# Patient Record
Sex: FEMALE | Race: OTHER | NOT HISPANIC OR LATINO | URBAN - METROPOLITAN AREA
[De-identification: names, ages, dates, MRNs, and addresses within clinical notes are randomized per-mention and may not be internally consistent; named-entity substitution may affect disease eponyms.]

---

## 2024-06-12 ENCOUNTER — HOSPITAL ENCOUNTER (EMERGENCY)
Facility: HOSPITAL | Age: 39
Discharge: HOME | End: 2024-06-12
Attending: STUDENT IN AN ORGANIZED HEALTH CARE EDUCATION/TRAINING PROGRAM
Payer: OTHER GOVERNMENT

## 2024-06-12 VITALS
RESPIRATION RATE: 16 BRPM | SYSTOLIC BLOOD PRESSURE: 134 MMHG | TEMPERATURE: 98 F | HEART RATE: 71 BPM | OXYGEN SATURATION: 99 % | WEIGHT: 180 LBS | DIASTOLIC BLOOD PRESSURE: 73 MMHG | HEIGHT: 69 IN | BODY MASS INDEX: 26.66 KG/M2

## 2024-06-12 DIAGNOSIS — K57.32 CECAL DIVERTICULITIS: Primary | ICD-10-CM

## 2024-06-12 DIAGNOSIS — R10.31 RIGHT LOWER QUADRANT ABDOMINAL PAIN: ICD-10-CM

## 2024-06-12 LAB
ALBUMIN SERPL BCP-MCNC: 3.9 G/DL (ref 3.5–5.2)
ALP SERPL-CCNC: 66 U/L (ref 55–135)
ALT SERPL W/O P-5'-P-CCNC: 12 U/L (ref 10–44)
ANION GAP SERPL CALC-SCNC: 6 MMOL/L (ref 8–16)
AST SERPL-CCNC: 14 U/L (ref 10–40)
B-HCG UR QL: NEGATIVE
BACTERIA #/AREA URNS AUTO: ABNORMAL /HPF
BASOPHILS # BLD AUTO: 0.04 K/UL (ref 0–0.2)
BASOPHILS NFR BLD: 0.4 % (ref 0–1.9)
BILIRUB SERPL-MCNC: 0.5 MG/DL (ref 0.1–1)
BILIRUB UR QL STRIP: NEGATIVE
BUN SERPL-MCNC: 8 MG/DL (ref 6–20)
CALCIUM SERPL-MCNC: 10 MG/DL (ref 8.7–10.5)
CHLORIDE SERPL-SCNC: 104 MMOL/L (ref 95–110)
CLARITY UR REFRACT.AUTO: CLEAR
CO2 SERPL-SCNC: 26 MMOL/L (ref 23–29)
COLOR UR AUTO: YELLOW
CREAT SERPL-MCNC: 1 MG/DL (ref 0.5–1.4)
CTP QC/QA: YES
DIFFERENTIAL METHOD BLD: ABNORMAL
EOSINOPHIL # BLD AUTO: 0.1 K/UL (ref 0–0.5)
EOSINOPHIL NFR BLD: 1.4 % (ref 0–8)
ERYTHROCYTE [DISTWIDTH] IN BLOOD BY AUTOMATED COUNT: 11 % (ref 11.5–14.5)
EST. GFR  (NO RACE VARIABLE): >60 ML/MIN/1.73 M^2
GLUCOSE SERPL-MCNC: 86 MG/DL (ref 70–110)
GLUCOSE UR QL STRIP: NEGATIVE
HCT VFR BLD AUTO: 40.2 % (ref 37–48.5)
HCV AB SERPL QL IA: NORMAL
HGB BLD-MCNC: 13.3 G/DL (ref 12–16)
HGB UR QL STRIP: ABNORMAL
HIV 1+2 AB+HIV1 P24 AG SERPL QL IA: NORMAL
HYALINE CASTS UR QL AUTO: 2 /LPF
IMM GRANULOCYTES # BLD AUTO: 0.03 K/UL (ref 0–0.04)
IMM GRANULOCYTES NFR BLD AUTO: 0.3 % (ref 0–0.5)
KETONES UR QL STRIP: ABNORMAL
LEUKOCYTE ESTERASE UR QL STRIP: NEGATIVE
LIPASE SERPL-CCNC: 13 U/L (ref 4–60)
LYMPHOCYTES # BLD AUTO: 1.4 K/UL (ref 1–4.8)
LYMPHOCYTES NFR BLD: 15.9 % (ref 18–48)
MCH RBC QN AUTO: 32.1 PG (ref 27–31)
MCHC RBC AUTO-ENTMCNC: 33.1 G/DL (ref 32–36)
MCV RBC AUTO: 97 FL (ref 82–98)
MICROSCOPIC COMMENT: ABNORMAL
MONOCYTES # BLD AUTO: 0.8 K/UL (ref 0.3–1)
MONOCYTES NFR BLD: 8.9 % (ref 4–15)
NEUTROPHILS # BLD AUTO: 6.6 K/UL (ref 1.8–7.7)
NEUTROPHILS NFR BLD: 73.1 % (ref 38–73)
NITRITE UR QL STRIP: NEGATIVE
NRBC BLD-RTO: 0 /100 WBC
PH UR STRIP: 6 [PH] (ref 5–8)
PLATELET # BLD AUTO: 326 K/UL (ref 150–450)
PMV BLD AUTO: 10.1 FL (ref 9.2–12.9)
POTASSIUM SERPL-SCNC: 4 MMOL/L (ref 3.5–5.1)
PROT SERPL-MCNC: 7.1 G/DL (ref 6–8.4)
PROT UR QL STRIP: ABNORMAL
RBC # BLD AUTO: 4.14 M/UL (ref 4–5.4)
RBC #/AREA URNS AUTO: 1 /HPF (ref 0–4)
SODIUM SERPL-SCNC: 136 MMOL/L (ref 136–145)
SP GR UR STRIP: >1.03 (ref 1–1.03)
SQUAMOUS #/AREA URNS AUTO: 3 /HPF
URN SPEC COLLECT METH UR: ABNORMAL
WBC # BLD AUTO: 8.99 K/UL (ref 3.9–12.7)
WBC #/AREA URNS AUTO: 2 /HPF (ref 0–5)

## 2024-06-12 PROCEDURE — 25000003 PHARM REV CODE 250: Performed by: EMERGENCY MEDICINE

## 2024-06-12 PROCEDURE — 81025 URINE PREGNANCY TEST: CPT | Performed by: EMERGENCY MEDICINE

## 2024-06-12 PROCEDURE — 80053 COMPREHEN METABOLIC PANEL: CPT | Performed by: EMERGENCY MEDICINE

## 2024-06-12 PROCEDURE — 63600175 PHARM REV CODE 636 W HCPCS: Performed by: STUDENT IN AN ORGANIZED HEALTH CARE EDUCATION/TRAINING PROGRAM

## 2024-06-12 PROCEDURE — 83690 ASSAY OF LIPASE: CPT | Performed by: EMERGENCY MEDICINE

## 2024-06-12 PROCEDURE — 96374 THER/PROPH/DIAG INJ IV PUSH: CPT

## 2024-06-12 PROCEDURE — 25500020 PHARM REV CODE 255: Performed by: STUDENT IN AN ORGANIZED HEALTH CARE EDUCATION/TRAINING PROGRAM

## 2024-06-12 PROCEDURE — 81001 URINALYSIS AUTO W/SCOPE: CPT | Performed by: EMERGENCY MEDICINE

## 2024-06-12 PROCEDURE — 99285 EMERGENCY DEPT VISIT HI MDM: CPT | Mod: 25

## 2024-06-12 PROCEDURE — 85025 COMPLETE CBC W/AUTO DIFF WBC: CPT | Performed by: EMERGENCY MEDICINE

## 2024-06-12 PROCEDURE — 87389 HIV-1 AG W/HIV-1&-2 AB AG IA: CPT | Performed by: PHYSICIAN ASSISTANT

## 2024-06-12 PROCEDURE — 96375 TX/PRO/DX INJ NEW DRUG ADDON: CPT

## 2024-06-12 PROCEDURE — 86803 HEPATITIS C AB TEST: CPT | Performed by: PHYSICIAN ASSISTANT

## 2024-06-12 RX ORDER — AMOXICILLIN AND CLAVULANATE POTASSIUM 875; 125 MG/1; MG/1
1 TABLET, FILM COATED ORAL 2 TIMES DAILY
Qty: 20 TABLET | Refills: 0 | Status: SHIPPED | OUTPATIENT
Start: 2024-06-12 | End: 2024-06-22

## 2024-06-12 RX ORDER — ONDANSETRON 4 MG/1
8 TABLET, ORALLY DISINTEGRATING ORAL EVERY 8 HOURS PRN
Qty: 12 TABLET | Refills: 0 | Status: SHIPPED | OUTPATIENT
Start: 2024-06-12

## 2024-06-12 RX ORDER — ONDANSETRON HYDROCHLORIDE 2 MG/ML
4 INJECTION, SOLUTION INTRAVENOUS
Status: COMPLETED | OUTPATIENT
Start: 2024-06-12 | End: 2024-06-12

## 2024-06-12 RX ORDER — KETOROLAC TROMETHAMINE 30 MG/ML
10 INJECTION, SOLUTION INTRAMUSCULAR; INTRAVENOUS
Status: COMPLETED | OUTPATIENT
Start: 2024-06-12 | End: 2024-06-12

## 2024-06-12 RX ORDER — MORPHINE SULFATE 4 MG/ML
4 INJECTION, SOLUTION INTRAMUSCULAR; INTRAVENOUS
Status: COMPLETED | OUTPATIENT
Start: 2024-06-12 | End: 2024-06-12

## 2024-06-12 RX ORDER — AMOXICILLIN AND CLAVULANATE POTASSIUM 875; 125 MG/1; MG/1
1 TABLET, FILM COATED ORAL
Status: COMPLETED | OUTPATIENT
Start: 2024-06-12 | End: 2024-06-12

## 2024-06-12 RX ADMIN — KETOROLAC TROMETHAMINE 10 MG: 30 INJECTION, SOLUTION INTRAMUSCULAR at 08:06

## 2024-06-12 RX ADMIN — ONDANSETRON 4 MG: 2 INJECTION INTRAMUSCULAR; INTRAVENOUS at 10:06

## 2024-06-12 RX ADMIN — AMOXICILLIN AND CLAVULANATE POTASSIUM 1 TABLET: 875; 125 TABLET, FILM COATED ORAL at 11:06

## 2024-06-12 RX ADMIN — MORPHINE SULFATE 4 MG: 4 INJECTION INTRAVENOUS at 10:06

## 2024-06-12 RX ADMIN — IOHEXOL 75 ML: 350 INJECTION, SOLUTION INTRAVENOUS at 08:06

## 2024-06-12 NOTE — Clinical Note
"Gwendolyn Moralesmarylin Welch was seen and treated in our emergency department on 6/12/2024.  She may return to work on 06/15/2024.       If you have any questions or concerns, please don't hesitate to call.      Saniya Sanchez MD"

## 2024-06-12 NOTE — FIRST PROVIDER EVALUATION
"Medical screening examination initiated.  I have conducted a focused provider triage encounter, findings are as follows:    Brief history of present illness:  generalized abd pain x2d, moved to [facility] today.  Anorexia x2d. "I think I have appendicitis."    Vitals:    06/12/24 1635   BP: 122/79   BP Location: Right arm   Patient Position: Sitting   Pulse: 93   Resp: 19   Temp: 97.9 °F (36.6 °C)   TempSrc: Oral   SpO2: 98%   Weight: 81.6 kg (180 lb)   Height: 5' 9" (1.753 m)       Pertinent physical exam:  RLQ TTP    Brief workup plan:  CT, labs    Preliminary workup initiated; this workup will be continued and followed by the physician or advanced practice provider that is assigned to the patient when roomed.  "

## 2024-06-13 NOTE — ED NOTES
"Gwendolyn Welch, a 39 y.o. female presents to the ED w/ complaint of RLQ  abd pain, 2/10 when not moving 8/10 when touching or moving around    Triage note:  Chief Complaint   Patient presents with    Abdominal Pain     RLQ abd pain; started as a lower center cramps. "Hurting when going over bumps in the car". Pain got worse throughout the day. Nausea yesterday. No fever. No vomiting pt is concerned for appendicitis      Review of patient's allergies indicates:  Not on File  History reviewed. No pertinent past medical history.    Patient identifiers verified and correct for Gwendolyn Welch.    LOC: The patient is awake, alert and oriented x 4. Pt is speaking appropriately, no slurred speech.  APPEARANCE: Patient resting comfortably and in no acute distress. Pt is clean and well groomed. No JVD visible. Pt reports pain level of 0.  SKIN: Skin is warm dry and intact, and color is consistent with ethnicity. No tenting observed and capillary refill <3 seconds. No clubbing noted to nail beds. No breakdown or brusing visible and mucus membranes moist and acyanotic.  MUSCULOSKELETAL: Full range of motion present in all extremities. Hand  equal and leg strength strong +2 bilaterally.  RESPIRATORY: Airway is open and patent. Respirations-unlabored, regular rate, equal bilaterally on inspiration and expiration. No accessory muscle use noted. Lungs clear to auscultation in all fields bilaterally anterior and posterior.   CARDIAC: Patient has regular heart rate and rhythm.  No peripheral edema noted, and patient has no c/o chest pain.  ABDOMEN: RLQ abdominal pain  NEUROLOGIC: Eyes open spontaneously and facial expression symmetrical. Pt behavior appropriate to situation, and pt follows commands.  Pt reports sensation present in all extremities when touched with a finger. No s/s of ischemic stroke. PERRLA  : No complaints of frequency, burning, urgency or blood in the urine.          "

## 2024-06-13 NOTE — ED PROVIDER NOTES
"Encounter Date: 6/12/2024       History     Chief Complaint   Patient presents with    Abdominal Pain     RLQ abd pain; started as a lower center cramps. "Hurting when going over bumps in the car". Pain got worse throughout the day. Nausea yesterday. No fever. No vomiting pt is concerned for appendicitis      39-year-old female presents with abdominal pain.  Patient reports 3-4 day history of initially periumbilical abdominal pain but now right lower quadrant pain.  She describes periumbilical cramping for the past few days intermittently.  Today it seemed to migrate to the right lower quadrant.  It is intermittent and at its worst is around an 8/10.  When she was just sitting at rest it has around a 4/10.  She had nausea yesterday but not today and has not had any vomiting.  Denies fever, chest pain, shortness of breath, dysuria, hematuria, diarrhea, constipation, black/bloody stools, vaginal bleeding, and vaginal discharge.  She has an IUD and has irregular periods.  Denies any history of intra-abdominal or pelvic surgeries.  She has not taken any medications at home for her symptoms.    The history is provided by the patient.     Review of patient's allergies indicates:  No Known Allergies  History reviewed. No pertinent past medical history.  History reviewed. No pertinent surgical history.  No family history on file.     Review of Systems    Physical Exam     Initial Vitals [06/12/24 1635]   BP Pulse Resp Temp SpO2   122/79 93 19 97.9 °F (36.6 °C) 98 %      MAP       --         Physical Exam    Nursing note and vitals reviewed.  Constitutional: She appears well-developed and well-nourished. She is not diaphoretic. No distress.   HENT:   Head: Normocephalic and atraumatic.   Eyes: Conjunctivae and EOM are normal. Pupils are equal, round, and reactive to light.   Neck: Neck supple.   Normal range of motion.  Cardiovascular:  Normal rate, regular rhythm, normal heart sounds and intact distal pulses.           No " murmur heard.  Pulmonary/Chest: Breath sounds normal. No respiratory distress. She has no wheezes. She has no rhonchi. She has no rales.   Abdominal: Abdomen is soft.   Soft, mildly distended abdomen with notable TTP in RLQ and RUQ. No rebound or guarding There is no rebound and no guarding.   Musculoskeletal:         General: No tenderness or edema.      Cervical back: Normal range of motion and neck supple.     Neurological: She is alert and oriented to person, place, and time.   Skin: Skin is warm and dry. Capillary refill takes less than 2 seconds.         ED Course   Procedures  Labs Reviewed   CBC W/ AUTO DIFFERENTIAL - Abnormal; Notable for the following components:       Result Value    MCH 32.1 (*)     RDW 11.0 (*)     Gran % 73.1 (*)     Lymph % 15.9 (*)     All other components within normal limits    Narrative:     Release to patient->Immediate   COMPREHENSIVE METABOLIC PANEL - Abnormal; Notable for the following components:    Anion Gap 6 (*)     All other components within normal limits    Narrative:     Release to patient->Immediate   URINALYSIS, REFLEX TO URINE CULTURE - Abnormal; Notable for the following components:    Specific Gravity, UA >1.030 (*)     Protein, UA 1+ (*)     Ketones, UA Trace (*)     Occult Blood UA Trace (*)     All other components within normal limits    Narrative:     Specimen Source->Urine   URINALYSIS MICROSCOPIC - Abnormal; Notable for the following components:    Hyaline Casts, UA 2 (*)     All other components within normal limits    Narrative:     Specimen Source->Urine   HIV 1 / 2 ANTIBODY    Narrative:     Release to patient->Immediate   HEPATITIS C ANTIBODY    Narrative:     Release to patient->Immediate   LIPASE    Narrative:     Release to patient->Immediate   POCT URINE PREGNANCY   ISTAT CHEM8          Imaging Results              CT Abdomen Pelvis With IV Contrast NO Oral Contrast (In process)                      Medications   morphine injection 4 mg (has no  administration in time range)   ondansetron injection 4 mg (has no administration in time range)   ketorolac injection 9.999 mg (9.999 mg Intravenous Given 6/12/24 2012)   iohexoL (OMNIPAQUE 350) injection 75 mL (75 mLs Intravenous Given 6/12/24 2046)     Medical Decision Making  Differential diagnoses considered includes appendicitis, colitis, cholecystitis, gastritis/PUD, nephrolithiasis, pyelonephritis, ectopic pregnancy, doubt ovarian torsion    Initial symptom management with Toradol, morphine, and Zofran.  See ED course for additional information.    Amount and/or Complexity of Data Reviewed  Labs:  Decision-making details documented in ED Course.    Risk  Prescription drug management.  Parenteral controlled substances.               ED Course as of 06/12/24 2054 Wed Jun 12, 2024 1945 CBC W/ AUTO DIFFERENTIAL(!)  CBC unremarkable without leukocytosis, significant anemia, or decreased platelets   [BD]   1945 Comp. Metabolic Panel(!)  CMP unremarkable without significant electrolyte derangement, impaired renal function, or elevated LFTs   [BD]   1945 Urinalysis, Reflex to Urine Culture Urine, Clean Catch(!)  Inconsistent with UTI. Minimal blood, so kidney stone less likely but CT is pending [BD]   1945 Lipase: 13  Inconsistent with pancreatitis [BD]   1945 hCG Qualitative, Urine: Negative [BD]   2054 Patient care will be handed off to oncoming team at 9:00 p.m., pending CT abdomen pelvis. [BD]      ED Course User Index  [BD] Charanjit Hu MD                           Clinical Impression:  Final diagnoses:  [R10.31] Right lower quadrant abdominal pain (Primary)                 Charanjit Hu MD  06/12/24 2054

## 2024-06-13 NOTE — DISCHARGE INSTRUCTIONS
Imaging Results               CT Abdomen Pelvis With IV Contrast NO Oral Contrast (Final result)  Result time 06/12/24 22:51:41      Final result by Gerber Kee MD (06/12/24 22:51:41)                   Impression:      Uncomplicated cecal diverticulitis (compatible with Hinchey stage Ia).  No definite evidence of perforation.    Poorly defined right upper renal pole hypoattenuating lesion, possibly a mass; malignancy or focal segmental pyelonephritis not excluded.  Recommendations include clinical correlation, prompt outpatient urology referral, and prompt outpatient renal mass protocol MRI or CT.    Mild hepatomegaly.    An IUD projects within the anticipated region of the poorly defined fundal endometrium.    Small hiatal hernia.    Additional findings as detailed in the body of the report.    This report was flagged in Epic as abnormal.    Electronically signed by resident: Jaylene Lopez  Date:    06/12/2024  Time:    21:11    Electronically signed by: Gerber Kee  Date:    06/12/2024  Time:    22:51               Narrative:    EXAMINATION:  CT ABDOMEN PELVIS WITH IV CONTRAST    CLINICAL HISTORY:  RLQ abdominal pain (Age >= 14y);    TECHNIQUE:  Using 75 mL of Omnipaque 350 IV contrast , and multi-detector helical CT technique, axial CT images of the abdomen and pelvis were obtained. 2D post-processing coronal and sagittal reconstructions was performed.    COMPARISON:  None.    FINDINGS:  Lung base: Mild bi basilar dependent atelectasis.    Visualized portion of heart: No significant abnormality.    Liver: Hepatomegaly measuring 18.3 cm in clinical examination.Small anteromedial left lobe medial 6 hypodensity adjacent to the falciform ligament, likely representing fatty infiltration.    Gallbladder: No significant abnormality.    Bile duct: No intra-or extrahepatic biliary ductal dilatation.    Spleen: No significant abnormality.    Pancreas: No significant abnormality.    Adrenal glands: No significant  abnormality.    Genitourinary: Right kidney superomedial aspect ill-defined hypodensity measuring 2.0 x 1.9 cm.    Small bilateral extrarenal pelves, left more prominent than right.    The ureters appear normal in course and caliber.    No evidence of nephrolithiasis or hydroureteronephrosis.    The urinary bladder is only mildly distended, limiting CT assessment for bladder wall thickening.    Reproductive organs: An IUD projects within the anticipated region of the poorly defined fundal endometrium.  No acute adnexal abnormalities apparent on this CT.    GI tract: Small hiatal hernia.  The visualized loops of small and large bowel show no evidence of obstruction.  Diverticulosis.  Focal cecal wall thickening associated with adjacent fat stranding.  No discrete fluid collection.  Appendix is normal (601-98).    Peritoneum: No free intraperitoneal air.    Trace intraperitoneal fluid in the pelvic cul-de-sac.    Retroperitoneum: No significant adenopathy.    Vasculature: Normal caliber of abdominal aorta with no significant atherosclerosis.    Right main renal vein, suprarenal IVC, and visualized caudal portions of the right atrium demonstrate no overt thrombi.    Abdominal wall: Tiny fat-containing umbilical hernia.    The breasts are not fully included in the scan, but their visualized caudal portions demonstrate some fairly dense breast tissue bilaterally.  Radiographically dense breast tissue may be associated with an increased lifetime risk of developing breast cancer.    Bones: No acute osseous abnormality.  Mild multilevel thoracolumbar Schmorl's nodes.

## 2024-06-13 NOTE — PROVIDER PROGRESS NOTES - EMERGENCY DEPT.
Imaging Results               CT Abdomen Pelvis With IV Contrast NO Oral Contrast (Final result)  Result time 06/12/24 22:51:41      Final result by Gerber Kee MD (06/12/24 22:51:41)                   Impression:      Uncomplicated cecal diverticulitis (compatible with Hinchey stage Ia).  No definite evidence of perforation.    Poorly defined right upper renal pole hypoattenuating lesion, possibly a mass; malignancy or focal segmental pyelonephritis not excluded.  Recommendations include clinical correlation, prompt outpatient urology referral, and prompt outpatient renal mass protocol MRI or CT.    Mild hepatomegaly.    An IUD projects within the anticipated region of the poorly defined fundal endometrium.    Small hiatal hernia.    Additional findings as detailed in the body of the report.    This report was flagged in Epic as abnormal.    Electronically signed by resident: Jaylene Lopez  Date:    06/12/2024  Time:    21:11    Electronically signed by: Gerber Kee  Date:    06/12/2024  Time:    22:51               Narrative:    EXAMINATION:  CT ABDOMEN PELVIS WITH IV CONTRAST    CLINICAL HISTORY:  RLQ abdominal pain (Age >= 14y);    TECHNIQUE:  Using 75 mL of Omnipaque 350 IV contrast , and multi-detector helical CT technique, axial CT images of the abdomen and pelvis were obtained. 2D post-processing coronal and sagittal reconstructions was performed.    COMPARISON:  None.    FINDINGS:  Lung base: Mild bi basilar dependent atelectasis.    Visualized portion of heart: No significant abnormality.    Liver: Hepatomegaly measuring 18.3 cm in clinical examination.Small anteromedial left lobe medial 6 hypodensity adjacent to the falciform ligament, likely representing fatty infiltration.    Gallbladder: No significant abnormality.    Bile duct: No intra-or extrahepatic biliary ductal dilatation.    Spleen: No significant abnormality.    Pancreas: No significant abnormality.    Adrenal glands: No significant  abnormality.    Genitourinary: Right kidney superomedial aspect ill-defined hypodensity measuring 2.0 x 1.9 cm.    Small bilateral extrarenal pelves, left more prominent than right.    The ureters appear normal in course and caliber.    No evidence of nephrolithiasis or hydroureteronephrosis.    The urinary bladder is only mildly distended, limiting CT assessment for bladder wall thickening.    Reproductive organs: An IUD projects within the anticipated region of the poorly defined fundal endometrium.  No acute adnexal abnormalities apparent on this CT.    GI tract: Small hiatal hernia.  The visualized loops of small and large bowel show no evidence of obstruction.  Diverticulosis.  Focal cecal wall thickening associated with adjacent fat stranding.  No discrete fluid collection.  Appendix is normal (601-98).    Peritoneum: No free intraperitoneal air.    Trace intraperitoneal fluid in the pelvic cul-de-sac.    Retroperitoneum: No significant adenopathy.    Vasculature: Normal caliber of abdominal aorta with no significant atherosclerosis.    Right main renal vein, suprarenal IVC, and visualized caudal portions of the right atrium demonstrate no overt thrombi.    Abdominal wall: Tiny fat-containing umbilical hernia.    The breasts are not fully included in the scan, but their visualized caudal portions demonstrate some fairly dense breast tissue bilaterally.  Radiographically dense breast tissue may be associated with an increased lifetime risk of developing breast cancer.    Bones: No acute osseous abnormality.  Mild multilevel thoracolumbar Schmorl's nodes.                                     Patient was signed out pending CT abdomen pelvis.  CT shows acute uncomplicated diverticulitis. Patient started on abx, close outpatient follow up, strict return precautions discussed and patient agreeable with the plan.

## 2024-10-17 ENCOUNTER — OFFICE VISIT (OUTPATIENT)
Dept: INTERNAL MEDICINE | Facility: CLINIC | Age: 39
End: 2024-10-17
Payer: OTHER GOVERNMENT

## 2024-10-17 DIAGNOSIS — Z87.820 HX OF CONCUSSION: ICD-10-CM

## 2024-10-17 DIAGNOSIS — Z76.89 ENCOUNTER TO ESTABLISH CARE: Primary | ICD-10-CM

## 2024-10-17 DIAGNOSIS — R21 RASH: ICD-10-CM

## 2024-10-17 PROCEDURE — 99204 OFFICE O/P NEW MOD 45 MIN: CPT | Mod: 95,,, | Performed by: INTERNAL MEDICINE

## 2024-10-17 RX ORDER — BUPROPION HYDROCHLORIDE 150 MG/1
150 TABLET ORAL DAILY
COMMUNITY
Start: 2023-10-26

## 2024-10-17 RX ORDER — TRAZODONE HYDROCHLORIDE 100 MG/1
50 TABLET ORAL NIGHTLY
COMMUNITY
Start: 2024-03-14

## 2024-10-17 RX ORDER — LAMOTRIGINE 200 MG/1
200 TABLET ORAL DAILY
COMMUNITY
Start: 2023-10-26

## 2024-10-17 NOTE — PROGRESS NOTES
Subjective:       Patient ID: Gwendolyn Welch is a 39 y.o. female.    Chief Complaint: No chief complaint on file.      The patient location is: [place]   The chief complaint leading to consultation is: follow up     Visit type: audiovisual    Face to Face time with patient: 15  minutes  15 minutes of total time spent on the encounter, which includes face to face time and non-face to face time preparing to see the patient (eg, review of tests), Obtaining and/or reviewing separately obtained history, Documenting clinical information in the electronic or other health record, Independently interpreting results (not separately reported) and communicating results to the patient/family/caregiver, or Care coordination (not separately reported).         Each patient to whom he or she provides medical services by telemedicine is:  (1) informed of the relationship between the physician and patient and the respective role of any other health care provider with respect to management of the patient; and (2) notified that he or she may decline to receive medical services by telemedicine and may withdraw from such care at any time.    Notes:     HPI    Presents to establish care.     Hx of concussion.   Plays in roller derby.     Bipolar: on Wellbutrin and lamotrigine. Follows NP in psych. And trazodone for insomnia and anxiety     Health Maintenance:  Colon Cancer Screening: begin at age 45  Mammogram: begin at age 40   HIV: order today   Hep C: order today   Lipids:order today   Pap Smear:  needs to schedule  Vaccines:  up to date       Review of Systems   Constitutional:  Negative for activity change and unexpected weight change.   HENT:  Positive for rhinorrhea. Negative for hearing loss and trouble swallowing.    Eyes:  Positive for discharge and visual disturbance.   Respiratory:  Negative for chest tightness and wheezing.    Cardiovascular:  Negative for chest pain and palpitations.   Gastrointestinal:  Negative for blood in  stool, constipation, diarrhea and vomiting.   Endocrine: Negative for polydipsia and polyuria.   Genitourinary:  Negative for difficulty urinating, dysuria, hematuria and menstrual problem.   Musculoskeletal:  Negative for arthralgias, joint swelling and neck pain.   Neurological:  Positive for headaches. Negative for weakness.   Psychiatric/Behavioral:  Positive for confusion. Negative for dysphoric mood.          Objective:      Physical Exam  Constitutional:       General: She is not in acute distress.     Appearance: Normal appearance. She is not ill-appearing.   HENT:      Head: Normocephalic and atraumatic.   Pulmonary:      Effort: Pulmonary effort is normal.   Neurological:      Mental Status: She is alert.   Psychiatric:         Mood and Affect: Mood normal.         Behavior: Behavior normal.         Assessment:       Problem List Items Addressed This Visit    None  Visit Diagnoses       Encounter to establish care    -  Primary    Relevant Orders    Ambulatory referral/consult to Obstetrics / Gynecology    Comprehensive Metabolic Panel    CBC Auto Differential    Lipid Panel    Hemoglobin A1C    TSH    Hx of concussion        Relevant Orders    Ambulatory referral/consult to Concussion Management Program    Rash        Relevant Orders    Ambulatory referral/consult to Dermatology            Plan:       Diagnoses and all orders for this visit:    Encounter to establish care  -     Ambulatory referral/consult to Obstetrics / Gynecology; Future  -     Comprehensive Metabolic Panel; Future  -     CBC Auto Differential; Future  -     Lipid Panel; Future  -     Hemoglobin A1C; Future  -     TSH; Future  Colon Cancer Screening: begin at age 45  Mammogram: begin at age 40   HIV: order today   Hep C: order today   Lipids:order today   Pap Smear:  needs to schedule  Vaccines:  up to date     Hx of concussion  -     Ambulatory referral/consult to Concussion Management Program; Future    Rash  -     Ambulatory  referral/consult to Dermatology; Future         Check labs and follow up in the office to establish care.      Zhane Reynaga MD

## 2025-01-29 ENCOUNTER — OFFICE VISIT (OUTPATIENT)
Dept: INTERNAL MEDICINE | Facility: CLINIC | Age: 40
End: 2025-01-29
Payer: OTHER GOVERNMENT

## 2025-01-29 VITALS
BODY MASS INDEX: 23.25 KG/M2 | HEART RATE: 73 BPM | HEIGHT: 69 IN | OXYGEN SATURATION: 99 % | DIASTOLIC BLOOD PRESSURE: 71 MMHG | SYSTOLIC BLOOD PRESSURE: 128 MMHG | WEIGHT: 156.94 LBS

## 2025-01-29 DIAGNOSIS — Z87.820 HX OF CONCUSSION: ICD-10-CM

## 2025-01-29 DIAGNOSIS — Z00.00 ANNUAL PHYSICAL EXAM: Primary | ICD-10-CM

## 2025-01-29 DIAGNOSIS — R21 RASH: ICD-10-CM

## 2025-01-29 DIAGNOSIS — R42 DIZZINESS AND GIDDINESS: ICD-10-CM

## 2025-01-29 PROCEDURE — 99395 PREV VISIT EST AGE 18-39: CPT | Mod: S$PBB,,, | Performed by: INTERNAL MEDICINE

## 2025-01-29 PROCEDURE — 99215 OFFICE O/P EST HI 40 MIN: CPT | Mod: PBBFAC | Performed by: INTERNAL MEDICINE

## 2025-01-29 PROCEDURE — 99999 PR PBB SHADOW E&M-EST. PATIENT-LVL V: CPT | Mod: PBBFAC,,, | Performed by: INTERNAL MEDICINE

## 2025-01-29 NOTE — PROGRESS NOTES
Subjective:       Patient ID: Gwendolyn Welch is a 39 y.o. female.    Chief Complaint: Establish Care    HPI    Presents to establish care.     Has been experiencing multiple symptoms including dissociation, dizziness, brain fog and light sensitivity. Does have hx of concussion in the past. Play in roller derby. Denies headache.     PMHx:  Hx of Anxiety/Bipolar/Depression: follows with psych. Currently on Trazodone, Lamictal and Wellbutrin.       Health Maintenance:  Colon Cancer Screening: being at age 45   Mammogram: begin at age 40   HIV: negative 2024   Hep C: negative 2024   Lipids: normal 2024   Pap Smear: needs to schedule.   Vaccines:  up to date.       Review of Systems   Constitutional:  Negative for activity change and unexpected weight change.   HENT:  Negative for hearing loss, rhinorrhea and trouble swallowing.    Eyes:  Positive for visual disturbance. Negative for discharge.   Respiratory:  Negative for chest tightness and wheezing.    Cardiovascular:  Negative for chest pain and palpitations.   Gastrointestinal:  Negative for blood in stool, constipation, diarrhea and vomiting.   Endocrine: Negative for polydipsia and polyuria.   Genitourinary:  Negative for difficulty urinating, dysuria, hematuria and menstrual problem.   Musculoskeletal:  Negative for arthralgias, joint swelling and neck pain.   Neurological:  Negative for weakness and headaches.   Psychiatric/Behavioral:  Negative for confusion and dysphoric mood.            No past medical history on file.  No past surgical history on file.   There is no problem list on file for this patient.       Objective:      Physical Exam    Assessment:       Problem List Items Addressed This Visit    None  Visit Diagnoses         Annual physical exam    -  Primary    Relevant Orders    Comprehensive Metabolic Panel (Completed)    CBC Auto Differential (Completed)    Lipid Panel (Completed)    Hemoglobin A1C (Completed)      Hx of concussion        Relevant  Orders    Ambulatory referral/consult to Concussion Management Program      Dizziness and giddiness        Relevant Orders    TSH (Completed)    CT Head Without Contrast (Completed)    VITAMIN B12 (Completed)    VITAMIN B1 (Completed)    VITAMIN B6 (Completed)    IRON AND TIBC (Completed)    Vitamin D 25 hydroxy (Completed)    C-REACTIVE PROTEIN (Completed)    Sedimentation rate (Completed)      Rash        Relevant Orders    Ambulatory referral/consult to Dermatology            Plan:         Gwendolyn was seen today for establish care.    Diagnoses and all orders for this visit:    Annual physical exam  Colon Cancer Screening: being at age 45   Mammogram: begin at age 40   HIV: negative 2024   Hep C: negative 2024   Lipids: normal 2024   Pap Smear: needs to schedule.   Vaccines:  up to date.   Annual wellness exam completed.     All medications, histories, and concerns reviewed, reconciled, and addressed.     Appropriate Screenings per pt's sex and age have been reviewed and discussed with pt.       Hx of concussion  -     Ambulatory referral/consult to Concussion Management Program; Future    Dizziness and giddiness  -     TSH; Future  -     CT Head Without Contrast; Future  -     VITAMIN B12; Future  -     VITAMIN B1; Future  -     VITAMIN B6; Future  -     IRON AND TIBC; Future  -     Vitamin D 25 hydroxy; Future  -     C-REACTIVE PROTEIN; Future  -     Sedimentation rate; Future    Rash  -     Ambulatory referral/consult to Dermatology; Future     Check labs. Referral to concussion program for further management.             Zhane Templeton MD   Internal Medicine   Primary Care

## 2025-01-30 ENCOUNTER — TELEPHONE (OUTPATIENT)
Facility: CLINIC | Age: 40
End: 2025-01-30
Payer: OTHER GOVERNMENT

## 2025-01-31 ENCOUNTER — HOSPITAL ENCOUNTER (OUTPATIENT)
Dept: RADIOLOGY | Facility: HOSPITAL | Age: 40
Discharge: HOME | End: 2025-01-31
Attending: INTERNAL MEDICINE
Payer: OTHER GOVERNMENT

## 2025-01-31 DIAGNOSIS — R42 DIZZINESS AND GIDDINESS: ICD-10-CM

## 2025-01-31 PROCEDURE — 70450 CT HEAD/BRAIN W/O DYE: CPT | Mod: TC

## 2025-01-31 PROCEDURE — 70450 CT HEAD/BRAIN W/O DYE: CPT | Mod: 26,,, | Performed by: RADIOLOGY

## 2025-02-03 ENCOUNTER — TELEPHONE (OUTPATIENT)
Dept: DERMATOLOGY | Facility: CLINIC | Age: 40
End: 2025-02-03
Payer: OTHER GOVERNMENT

## 2025-02-17 ENCOUNTER — PATIENT MESSAGE (OUTPATIENT)
Dept: INTERNAL MEDICINE | Facility: CLINIC | Age: 40
End: 2025-02-17
Payer: OTHER GOVERNMENT

## 2025-02-17 ENCOUNTER — PATIENT MESSAGE (OUTPATIENT)
Dept: ADMINISTRATIVE | Facility: HOSPITAL | Age: 40
End: 2025-02-17
Payer: OTHER GOVERNMENT

## 2025-02-18 ENCOUNTER — RESULTS FOLLOW-UP (OUTPATIENT)
Dept: INTERNAL MEDICINE | Facility: CLINIC | Age: 40
End: 2025-02-18

## 2025-02-21 ENCOUNTER — PATIENT OUTREACH (OUTPATIENT)
Dept: ADMINISTRATIVE | Facility: HOSPITAL | Age: 40
End: 2025-02-21
Payer: OTHER GOVERNMENT

## 2025-02-21 DIAGNOSIS — Z12.4 ENCOUNTER FOR SCREENING FOR CERVICAL CANCER: Primary | ICD-10-CM

## 2025-02-21 NOTE — PROGRESS NOTES
Chart reviewed and updated. Reconciled immunizations, health maintenance and care everywhere.    Placed referral for Gyn and linked to scheduled appt.      Cesilia Klein [facility]  Panel Care Coordinator  [facility]  [phone]  Zhane Chan MD

## 2025-03-25 ENCOUNTER — OFFICE VISIT (OUTPATIENT)
Dept: OBSTETRICS AND GYNECOLOGY | Facility: CLINIC | Age: 40
End: 2025-03-25
Payer: OTHER GOVERNMENT

## 2025-03-25 ENCOUNTER — OFFICE VISIT (OUTPATIENT)
Dept: INTERNAL MEDICINE | Facility: CLINIC | Age: 40
End: 2025-03-25
Payer: OTHER GOVERNMENT

## 2025-03-25 VITALS
HEART RATE: 77 BPM | OXYGEN SATURATION: 98 % | WEIGHT: 171.5 LBS | BODY MASS INDEX: 25.4 KG/M2 | DIASTOLIC BLOOD PRESSURE: 62 MMHG | HEIGHT: 69 IN | SYSTOLIC BLOOD PRESSURE: 104 MMHG

## 2025-03-25 VITALS
BODY MASS INDEX: 25.47 KG/M2 | HEIGHT: 69 IN | DIASTOLIC BLOOD PRESSURE: 68 MMHG | WEIGHT: 171.94 LBS | SYSTOLIC BLOOD PRESSURE: 126 MMHG

## 2025-03-25 DIAGNOSIS — J32.9 SINUSITIS, UNSPECIFIED CHRONICITY, UNSPECIFIED LOCATION: Primary | ICD-10-CM

## 2025-03-25 DIAGNOSIS — N76.0 ACUTE VAGINITIS: ICD-10-CM

## 2025-03-25 DIAGNOSIS — Z12.4 ENCOUNTER FOR SCREENING FOR CERVICAL CANCER: Primary | ICD-10-CM

## 2025-03-25 DIAGNOSIS — M25.561 PAIN IN BOTH KNEES, UNSPECIFIED CHRONICITY: ICD-10-CM

## 2025-03-25 DIAGNOSIS — M25.562 PAIN IN BOTH KNEES, UNSPECIFIED CHRONICITY: ICD-10-CM

## 2025-03-25 DIAGNOSIS — Z12.31 VISIT FOR SCREENING MAMMOGRAM: ICD-10-CM

## 2025-03-25 DIAGNOSIS — R42 DIZZINESS: ICD-10-CM

## 2025-03-25 DIAGNOSIS — Z30.431 INTRAUTERINE DEVICE SURVEILLANCE: ICD-10-CM

## 2025-03-25 PROCEDURE — 99213 OFFICE O/P EST LOW 20 MIN: CPT | Mod: PBBFAC | Performed by: STUDENT IN AN ORGANIZED HEALTH CARE EDUCATION/TRAINING PROGRAM

## 2025-03-25 PROCEDURE — 99214 OFFICE O/P EST MOD 30 MIN: CPT | Mod: PBBFAC,27 | Performed by: NURSE PRACTITIONER

## 2025-03-25 PROCEDURE — 99999 PR PBB SHADOW E&M-EST. PATIENT-LVL III: CPT | Mod: PBBFAC,,, | Performed by: STUDENT IN AN ORGANIZED HEALTH CARE EDUCATION/TRAINING PROGRAM

## 2025-03-25 PROCEDURE — 99999 PR PBB SHADOW E&M-EST. PATIENT-LVL IV: CPT | Mod: PBBFAC,,, | Performed by: NURSE PRACTITIONER

## 2025-03-25 PROCEDURE — 81515 NFCT DS BV&VAGINITIS DNA ALG: CPT | Performed by: STUDENT IN AN ORGANIZED HEALTH CARE EDUCATION/TRAINING PROGRAM

## 2025-03-25 PROCEDURE — 99385 PREV VISIT NEW AGE 18-39: CPT | Mod: S$PBB,,, | Performed by: STUDENT IN AN ORGANIZED HEALTH CARE EDUCATION/TRAINING PROGRAM

## 2025-03-25 PROCEDURE — 99213 OFFICE O/P EST LOW 20 MIN: CPT | Mod: S$PBB,,, | Performed by: NURSE PRACTITIONER

## 2025-03-25 RX ORDER — BUPROPION HYDROCHLORIDE 300 MG/1
300 TABLET ORAL EVERY MORNING
COMMUNITY
Start: 2025-01-26

## 2025-03-25 RX ORDER — PROMETHAZINE HYDROCHLORIDE AND DEXTROMETHORPHAN HYDROBROMIDE 6.25; 15 MG/5ML; MG/5ML
5 SYRUP ORAL 4 TIMES DAILY PRN
COMMUNITY
Start: 2025-02-19 | End: 2025-03-25

## 2025-03-25 RX ORDER — MELOXICAM 7.5 MG/1
7.5 TABLET ORAL DAILY
Qty: 30 TABLET | Refills: 0 | Status: SHIPPED | OUTPATIENT
Start: 2025-03-25 | End: 2025-04-01

## 2025-03-25 RX ORDER — FLUTICASONE PROPIONATE 50 MCG
1 SPRAY, SUSPENSION (ML) NASAL DAILY
Qty: 16 ML | Refills: 0 | Status: SHIPPED | OUTPATIENT
Start: 2025-03-25

## 2025-03-25 RX ORDER — PREDNISONE 20 MG/1
20 TABLET ORAL
COMMUNITY
Start: 2025-02-19 | End: 2025-03-25

## 2025-03-25 RX ORDER — AMOXICILLIN 875 MG/1
875 TABLET, FILM COATED ORAL 2 TIMES DAILY
COMMUNITY
Start: 2025-02-19 | End: 2025-03-25

## 2025-03-25 RX ORDER — FLUCONAZOLE 150 MG/1
150 TABLET ORAL ONCE
Qty: 1 TABLET | Refills: 1 | Status: SHIPPED | OUTPATIENT
Start: 2025-03-25 | End: 2025-03-25

## 2025-03-25 RX ORDER — DOXYCYCLINE 100 MG/1
100 CAPSULE ORAL EVERY 12 HOURS
Qty: 14 CAPSULE | Refills: 0 | Status: SHIPPED | OUTPATIENT
Start: 2025-03-25

## 2025-03-25 RX ORDER — BENZONATATE 100 MG/1
200 CAPSULE ORAL
COMMUNITY
Start: 2025-02-19

## 2025-03-25 NOTE — PROGRESS NOTES
INTERNAL MEDICINE PROGRESS/URGENT CARE NOTE    CHIEF COMPLAINT     Chief Complaint   Patient presents with    Sinus Problem    Dizziness    Headache    Referral     PT       HPI     Gwendolyn Welch is a 39 y.o. female who presents for an urgent visit today.    PCP: Dr. Templeton    Lingering sinus issues x almost 1 month. treated at [facility] with amoxil, prednisone. Improved but still lingering. Not taking anything otc. No fevers. Some discolored rhinorrhea. Overall improved but won't resolve. She's also had intermittent dizziness and feeling off balance since getting sick.     Bilat knee pain. Plays rollTwyxtby. Worsened over last 2 weeks. Pain with movement. No swelling. Hears clicks.    Problem List[1]     Past Medical History:  Past Medical History:   Diagnosis Date    Abnormal Pap smear of cervix     Herpes simplex virus (HSV) infection     Mental disorder         Past Surgical History:  No past surgical history on file.     Allergies:  Review of patient's allergies indicates:  No Known Allergies    Home Medications:  Current Medications[2]     Review of Systems:  Review of Systems   Constitutional:  Negative for activity change, fever and unexpected weight change.   HENT:  Positive for congestion, postnasal drip and rhinorrhea. Negative for hearing loss and trouble swallowing.    Eyes:  Negative for discharge and visual disturbance.   Respiratory:  Negative for chest tightness and wheezing.    Cardiovascular:  Negative for chest pain and palpitations.   Gastrointestinal:  Negative for blood in stool, constipation, diarrhea and vomiting.   Endocrine: Negative for polydipsia and polyuria.   Genitourinary:  Negative for difficulty urinating, dysuria, hematuria and menstrual problem.   Musculoskeletal:  Positive for arthralgias. Negative for neck pain.   Neurological:  Positive for dizziness. Negative for weakness, numbness and headaches.   Psychiatric/Behavioral:  Negative for confusion and dysphoric mood.   "        PHYSICAL EXAM     Vitals:    03/25/25 1458   BP: 104/62   BP Location: Right arm   Patient Position: Sitting   Pulse: 77   SpO2: 98%   Weight: 77.8 kg (171 lb 8.3 oz)   Height: 5' 9" (1.753 m)      Body mass index is 25.33 kg/m².     Physical Exam  Vitals reviewed.   Constitutional:       Appearance: Normal appearance.   HENT:      Head: Normocephalic.      Right Ear: Tympanic membrane normal.      Left Ear: Tympanic membrane normal.      Nose: Congestion present.      Right Sinus: No maxillary sinus tenderness or frontal sinus tenderness.      Left Sinus: No maxillary sinus tenderness or frontal sinus tenderness.      Mouth/Throat:      Mouth: Mucous membranes are moist.      Pharynx: Oropharynx is clear. Uvula midline. No posterior oropharyngeal erythema or uvula swelling.   Eyes:      Extraocular Movements: Extraocular movements intact.      Conjunctiva/sclera: Conjunctivae normal.      Pupils: Pupils are equal, round, and reactive to light.   Cardiovascular:      Rate and Rhythm: Normal rate and regular rhythm.   Pulmonary:      Effort: Pulmonary effort is normal.      Breath sounds: Normal breath sounds.   Musculoskeletal:      Right knee: No swelling or bony tenderness. Normal range of motion.      Left knee: No swelling or bony tenderness. Normal range of motion.      Right lower leg: No edema.      Left lower leg: No edema.   Skin:     General: Skin is warm and dry.   Neurological:      General: No focal deficit present.      Mental Status: She is alert and oriented to person, place, and time.   Psychiatric:         Mood and Affect: Mood normal.         Behavior: Behavior normal.         LABS     Lab Results   Component Value Date    HGBA1C 4.7 01/31/2025     CMP  Sodium   Date Value Ref Range Status   01/31/2025 137 136 - 145 mmol/L Final     Potassium   Date Value Ref Range Status   01/31/2025 4.0 3.5 - 5.1 mmol/L Final     Chloride   Date Value Ref Range Status   01/31/2025 106 95 - 110 mmol/L " Final     CO2   Date Value Ref Range Status   01/31/2025 24 23 - 29 mmol/L Final     Glucose   Date Value Ref Range Status   01/31/2025 90 70 - 110 mg/dL Final     BUN   Date Value Ref Range Status   01/31/2025 9 6 - 20 mg/dL Final     Creatinine   Date Value Ref Range Status   01/31/2025 0.9 0.5 - 1.4 mg/dL Final     Calcium   Date Value Ref Range Status   01/31/2025 9.7 8.7 - 10.5 mg/dL Final     Total Protein   Date Value Ref Range Status   01/31/2025 7.3 6.0 - 8.4 g/dL Final     Albumin   Date Value Ref Range Status   01/31/2025 4.3 3.5 - 5.2 g/dL Final     Total Bilirubin   Date Value Ref Range Status   01/31/2025 0.4 0.1 - 1.0 mg/dL Final     Comment:     For infants and newborns, interpretation of results should be based  on gestational age, weight and in agreement with clinical  observations.    Premature Infant recommended reference ranges:  Up to 24 hours.............<8.0 mg/dL  Up to 48 hours............<12.0 mg/dL  3-5 days..................<15.0 mg/dL  6-29 days.................<15.0 mg/dL       Alkaline Phosphatase   Date Value Ref Range Status   01/31/2025 60 40 - 150 U/L Final     AST   Date Value Ref Range Status   01/31/2025 15 10 - 40 U/L Final     ALT   Date Value Ref Range Status   01/31/2025 11 10 - 44 U/L Final     Anion Gap   Date Value Ref Range Status   01/31/2025 7 (L) 8 - 16 mmol/L Final     Lab Results   Component Value Date    WBC 4.02 01/31/2025    HGB 14.1 01/31/2025    HCT 41.8 01/31/2025    MCV 98 01/31/2025     01/31/2025     Lab Results   Component Value Date    CHOL 189 01/31/2025     Lab Results   Component Value Date    HDL 60 01/31/2025     Lab Results   Component Value Date    LDLCALC 117.8 01/31/2025     Lab Results   Component Value Date    TRIG 56 01/31/2025     Lab Results   Component Value Date    CHOLHDL 31.7 01/31/2025     Lab Results   Component Value Date    TSH 3.054 01/31/2025       ASSESSMENT     1. Sinusitis, unspecified chronicity, unspecified location     2. Dizziness    3. Pain in both knees, unspecified chronicity           PLAN  We will treat with Flonase and recommended over-the-counter Sudafed and Zyrtec as well.  Start doxycycline.  This should help with the dizziness if it does not she will follow up.  She is asking for referral to ortho which I did placed.  We will trial her on meloxicam daily for a week.  Discussed nothing else over-the-counter for pain except Tylenol if needed    1. Dizziness    2. Sinusitis, unspecified chronicity, unspecified location  - doxycycline (VIBRAMYCIN) 100 MG Cap; Take 1 capsule (100 mg total) by mouth every 12 (twelve) hours.  Dispense: 14 capsule; Refill: 0  - fluticasone propionate (FLONASE) 50 mcg/actuation nasal spray; 1 spray (50 mcg total) by Each Nostril route once daily.  Dispense: 16 mL; Refill: 0    3. Pain in both knees, unspecified chronicity  - meloxicam (MOBIC) 7.5 MG tablet; Take 1 tablet (7.5 mg total) by mouth once daily. for 7 days  Dispense: 30 tablet; Refill: 0  - Ambulatory referral/consult to Orthopedics; Future       Follow up with PCP     Patient's plan/treatment was discussed including medications and possible side effects. Verbalized understanding of all instructions.     This note was partly generated with [employer] voice recognition software. I apologize for any possible typographical errors.          CHENCHO Potts-ROLY  Department of Internal Medicine - [facility]  03/25/2025          [1] There is no problem list on file for this patient.   [2]   Current Outpatient Medications:     benzonatate (TESSALON) 100 MG capsule, Take 200 mg by mouth., Disp: , Rfl:     buPROPion (WELLBUTRIN XL) 150 MG TB24 tablet, Take 300 mg by mouth once daily., Disp: , Rfl:     buPROPion (WELLBUTRIN XL) 300 MG 24 hr tablet, Take 300 mg by mouth every morning., Disp: , Rfl:     fluconazole (DIFLUCAN) 150 MG Tab, Take 1 tablet (150 mg total) by mouth once. REFILL AND RE-DOSE IF SYMPTOMS RECUR for 1 dose, Disp: 1  tablet, Rfl: 1    lamoTRIgine (LAMICTAL) 200 MG tablet, Take 200 mg by mouth once daily., Disp: , Rfl:     levonorgestrel (MIRENA IU), by Intrauterine route., Disp: , Rfl:     ondansetron (ZOFRAN-ODT) 4 MG TbDL, Take 2 tablets (8 mg total) by mouth every 8 (eight) hours as needed (nausea, vomiting)., Disp: 12 tablet, Rfl: 0    traZODone (DESYREL) 100 MG tablet, Take 100 mg by mouth every evening., Disp: , Rfl:     doxycycline (VIBRAMYCIN) 100 MG Cap, Take 1 capsule (100 mg total) by mouth every 12 (twelve) hours., Disp: 14 capsule, Rfl: 0    fluticasone propionate (FLONASE) 50 mcg/actuation nasal spray, 1 spray (50 mcg total) by Each Nostril route once daily., Disp: 16 mL, Rfl: 0    meloxicam (MOBIC) 7.5 MG tablet, Take 1 tablet (7.5 mg total) by mouth once daily. for 7 days, Disp: 30 tablet, Rfl: 0

## 2025-03-25 NOTE — PROGRESS NOTES
"  ROUTINE ANNUAL GYN VISIT    HPI: This is a 39 y.o.    who presents for routine annual exam.   Concerns brought up today:  Vaginal itching - hx recurrent yeast/UTI, been ok recently    Contraception: Mirena placed  (8 years)  STI screening: low risk, declines    Cervical cancer screening:    Most recent: few years, out of system   History abnormal: yes- a few, no treatment required, past few normal   Gardasil: complete    Breast cancer screening: n/a, due age 40   Colon cancer screening: n/a, due age 45    Family history breast cancer: no  Family history ovarian cancer: no  Family history colon cancer: no      OB History    Para Term  AB Living   0 0 0 0 0 0   SAB IAB Ectopic Multiple Live Births   0 0 0 0 0          OBJECTIVE:   /68 (BP Location: Right arm)   Ht 5' 9" (1.753 m)   Wt 78 kg (171 lb 15.3 oz)   LMP  (LMP Unknown)   BMI 25.39 kg/m²      PE:   APPEARANCE: Well nourished, well developed, no acute distress.  NODES: no inguinal lymphadenopathy  BREASTS: Symmetrical, no skin changes or visible lesions. No palpable masses, nipple discharge or adenopathy bilaterally.  ABDOMEN: Soft. No tenderness or masses. No distention.   VULVA: No lesions. Normal external female genitalia.  URETHRAL MEATUS: Normal size and location, no lesions, no prolapse.  URETHRA: No masses, tenderness, or prolapse.  VAGINA: Moist. No lesions or lacerations noted. No abnormal discharge present. No odor present.   CERVIX: No lesions or discharge. No cervical motion tenderness. +IUD strings seen  UTERUS: Normal size, regular shape, mobile, non-tender.  ADNEXA: No tenderness. No fullness or masses palpated in the adnexal regions.   ANUS PERINEUM: Normal.      Diagnosis:  1. Encounter for screening for cervical cancer    2. Visit for screening mammogram    3. Acute vaginitis    4. Intrauterine device surveillance        Plan:     Gwendolyn was seen today for annual exam.    Diagnoses and all orders for " this visit:    Encounter for screening for cervical cancer  -     Ambulatory referral/consult to Obstetrics / Gynecology  -     Liquid-Based Pap Smear, Screening    Visit for screening mammogram  -     Mammo Digital Screening Bilat w/ Alberto (XPD); Future    Acute vaginitis  -     Vaginosis Screen by DNA Probe  -     fluconazole (DIFLUCAN) 150 MG Tab; Take 1 tablet (150 mg total) by mouth once. REFILL AND RE-DOSE IF SYMPTOMS RECUR for 1 dose    Intrauterine device surveillance        Patient was counseled today on the current  ACS guidelines for cervical cytology screening as well as the current recommendations for breast cancer screening.   She was counseled to follow up with her PCP for other routine health maintenance.        RTC 1 year or sooner PRN

## 2025-03-28 LAB
BACTERIAL VAGINOSIS DNA (OHS): NOT DETECTED
CANDIDA GLABRATA/KRUSEI DNA (OHS): NOT DETECTED
CANDIDA SPECIES DNA (OHS): NOT DETECTED
TRICHOMONAS VAGINALIS DNA (OHS): NOT DETECTED

## 2025-03-31 ENCOUNTER — RESULTS FOLLOW-UP (OUTPATIENT)
Dept: OBSTETRICS AND GYNECOLOGY | Facility: CLINIC | Age: 40
End: 2025-03-31

## 2025-04-02 ENCOUNTER — TELEPHONE (OUTPATIENT)
Dept: ORTHOPEDICS | Facility: CLINIC | Age: 40
End: 2025-04-02
Payer: OTHER GOVERNMENT

## 2025-04-02 ENCOUNTER — TELEPHONE (OUTPATIENT)
Dept: DERMATOLOGY | Facility: CLINIC | Age: 40
End: 2025-04-02
Payer: OTHER GOVERNMENT

## 2025-04-03 ENCOUNTER — OFFICE VISIT (OUTPATIENT)
Dept: DERMATOLOGY | Facility: CLINIC | Age: 40
End: 2025-04-03
Payer: OTHER GOVERNMENT

## 2025-04-03 DIAGNOSIS — L81.4 LENTIGO: ICD-10-CM

## 2025-04-03 DIAGNOSIS — Z12.83 SCREENING EXAM FOR SKIN CANCER: ICD-10-CM

## 2025-04-03 DIAGNOSIS — L85.8 KERATOSIS PILARIS: ICD-10-CM

## 2025-04-03 DIAGNOSIS — D22.9 MULTIPLE BENIGN NEVI: Primary | ICD-10-CM

## 2025-04-03 PROCEDURE — 99203 OFFICE O/P NEW LOW 30 MIN: CPT | Mod: S$PBB,,, | Performed by: DERMATOLOGY

## 2025-04-03 PROCEDURE — 99999 PR PBB SHADOW E&M-EST. PATIENT-LVL III: CPT | Mod: PBBFAC,,, | Performed by: DERMATOLOGY

## 2025-04-03 PROCEDURE — 99213 OFFICE O/P EST LOW 20 MIN: CPT | Mod: PBBFAC | Performed by: DERMATOLOGY

## 2025-04-03 NOTE — PROGRESS NOTES
Subjective:      Patient ID:  Gwendolyn Welch is a 39 y.o. female who presents for   Chief Complaint   Patient presents with    Skin Check     UBSE      Patient here for Upper Body Skin Exam    Last seen by dermatologist: Never     No - personal history of atypical moles removed  No - personal history of MM   No - family history of MM  Yes - childhood blistering sunburns  No - tanning bed use  No - personal history of NMSC    Patient with specific complaint of lesion(s)  Location: bilateral arms   Duration: 2 years   Symptoms: redness   Relieving factors/Previous treatments: TAC cream qday x 1 month - years ago - no help    Uses non-moisturizing soap, uses dove lotion qday, takes 1 lukewarm shower qday     Patient with new area of concern:   Location: upper back   Previous treatments: none     Patient with new area of concern:   Location: left flank +bigger   Previous treatments: none              Review of Systems   Skin:  Positive for daily sunscreen use, activity-related sunscreen use and wears hat (sometimes). Negative for itching, rash and recent sunburn.   Hematologic/Lymphatic: Bruises/bleeds easily (bruise).   Allergic/Immunologic: Positive for environmental allergies (flonase and allegra qday).       Objective:   Physical Exam   Constitutional: She appears well-developed and well-nourished. No distress.   Neurological: She is alert and oriented to person, place, and time. She is not disoriented.   Psychiatric: She has a normal mood and affect.   Skin:   Areas Examined (abnormalities noted in diagram):   Head / Face Inspection Performed  Neck Inspection Performed  Chest / Axilla Inspection Performed  Back Inspection Performed  RUE Inspected  LUE Inspection Performed                 Diagram Legend     Erythematous scaling macule/papule c/w actinic keratosis       Vascular papule c/w angioma      Pigmented verrucoid papule/plaque c/w seborrheic keratosis      Yellow umbilicated papule c/w sebaceous  hyperplasia      Irregularly shaped tan macule c/w lentigo     1-2 mm smooth white papules consistent with Milia      Movable subcutaneous cyst with punctum c/w epidermal inclusion cyst      Subcutaneous movable cyst c/w pilar cyst      Firm pink to brown papule c/w dermatofibroma      Pedunculated fleshy papule(s) c/w skin tag(s)      Evenly pigmented macule c/w junctional nevus     Mildly variegated pigmented, slightly irregular-bordered macule c/w mildly atypical nevus      Flesh colored to evenly pigmented papule c/w intradermal nevus       Pink pearly papule/plaque c/w basal cell carcinoma      Erythematous hyperkeratotic cursted plaque c/w SCC      Surgical scar with no sign of skin cancer recurrence      Open and closed comedones      Inflammatory papules and pustules      Verrucoid papule consistent consistent with wart     Erythematous eczematous patches and plaques     Dystrophic onycholytic nail with subungual debris c/w onychomycosis     Umbilicated papule    Erythematous-base heme-crusted tan verrucoid plaque consistent with inflamed seborrheic keratosis     Erythematous Silvery Scaling Plaque c/w Psoriasis     See annotation      Assessment / Plan:        Multiple benign nevi   - minor problem and chronic.   Reassurance given to patient. No treatment necessary.     Keratosis pilaris vs vasomotor lability  Pt with diffuse upper body vasodilation/vasoconstriction - nl variation   - minor problem and chronic.   Reassurance given to patient. No treatment necessary.      Lentigo  This is a benign hyperpigmented sun induced lesion. Recommend daily sun protection/avoidance and use of at least SPF 30, broad spectrum sunscreen (OTC drug) will reduce the number of new lesions. Treatment of these benign lesions are considered cosmetic.       Screening exam for skin cancer  Upper body skin examination performed today including at least 6 points as noted in physical examination. No lesions suspicious for malignancy  noted.    Recommend daily sun protection/avoidance and use of at least SPF 30, broad spectrum sunscreen (OTC drug).               No follow-ups on file.

## 2025-04-08 ENCOUNTER — HOSPITAL ENCOUNTER (OUTPATIENT)
Dept: RADIOLOGY | Facility: HOSPITAL | Age: 40
Discharge: HOME | End: 2025-04-08
Payer: OTHER GOVERNMENT

## 2025-04-08 ENCOUNTER — OFFICE VISIT (OUTPATIENT)
Dept: ORTHOPEDICS | Facility: CLINIC | Age: 40
End: 2025-04-08
Payer: OTHER GOVERNMENT

## 2025-04-08 VITALS — BODY MASS INDEX: 25.14 KG/M2 | HEIGHT: 69 IN | WEIGHT: 169.75 LBS

## 2025-04-08 DIAGNOSIS — M25.562 PAIN IN BOTH KNEES, UNSPECIFIED CHRONICITY: ICD-10-CM

## 2025-04-08 DIAGNOSIS — M25.562 PAIN IN BOTH KNEES, UNSPECIFIED CHRONICITY: Primary | ICD-10-CM

## 2025-04-08 DIAGNOSIS — M25.561 PAIN IN BOTH KNEES, UNSPECIFIED CHRONICITY: Primary | ICD-10-CM

## 2025-04-08 DIAGNOSIS — M25.561 PAIN IN BOTH KNEES, UNSPECIFIED CHRONICITY: ICD-10-CM

## 2025-04-08 PROCEDURE — 99213 OFFICE O/P EST LOW 20 MIN: CPT | Mod: S$PBB,,,

## 2025-04-08 PROCEDURE — 73562 X-RAY EXAM OF KNEE 3: CPT | Mod: TC,50

## 2025-04-08 PROCEDURE — 99999 PR PBB SHADOW E&M-EST. PATIENT-LVL IV: CPT | Mod: PBBFAC,,,

## 2025-04-08 PROCEDURE — 73562 X-RAY EXAM OF KNEE 3: CPT | Mod: 26,50,, | Performed by: RADIOLOGY

## 2025-04-08 PROCEDURE — 99214 OFFICE O/P EST MOD 30 MIN: CPT | Mod: PBBFAC,25

## 2025-04-08 RX ORDER — MELOXICAM 7.5 MG/1
7.5 TABLET ORAL DAILY
Qty: 30 TABLET | Refills: 1 | Status: SHIPPED | OUTPATIENT
Start: 2025-04-08

## 2025-04-08 NOTE — PROGRESS NOTES
SUBJECTIVE:     Chief Complaint & History of Present Illness:  History of Present Illness    CHIEF COMPLAINT:  - Bilateral knee pain, right worse than left.    HPI:  Ms. Welch presents with bilateral knee pain, right worse than left, with a 15-year history of playing roller derby contributing to gradual wear and tear. Pain has significantly worsened in the last couple of months, particularly in the right knee. She reports difficulty putting weight on the right knee and overcompensating with the left knee.    Her right knee pain is described as encompassing the entire knee, while left knee pain is localized below. Both knees exhibit clicking and popping, especially when squatting. She denies instability or buckling but mentions not feeling as secure in the right knee during weight-bearing exercises. She reports difficulty with stairs, creaking, and pain.    She has been taking meloxicam (7.5 mg) for the past two weeks, prescribed by her primary care physician, which has been helping with daily activities but not completely alleviating pain during workouts. She denies any recent falls or injuries, noting that she has not been playing roller derby recently due to a concussion.    Knee injury history includes MCL and meniscus injuries, but she denies any previous knee surgeries or injections. She reports a history of multiple concussions, stating it is her fifth overall. The first four were from roller derby, while the most recent and most severe occurred when she accidentally closed her car's hatchback on her head in June. She has been off skates since then and is taking the recovery seriously.    She denies swelling.    PREVIOUS TREATMENTS:  - Meloxicam 7.5 mg daily: 2 weeks, providing some benefit for normal daily activities but pain persists during workouts.    MEDICATIONS:  - Meloxicam: 7.5 mg daily for 2 weeks, providing some benefit for normal daily activities but pain persists during workouts.      ROS:  Head:  Call back from pt-advised of and explained info noted below. Patient voices understanding/agrees with plan/no further questions. sts will call ins and let us know. "+recent head injury  Musculoskeletal: +joint pain, +limb pain, +difficulty walking          Past Medical History:   Diagnosis Date    Abnormal Pap smear of cervix     Herpes simplex virus (HSV) infection     Mental disorder        No past surgical history on file.    No family history on file.    Review of patient's allergies indicates:  No Known Allergies      Current Medications[1]      OBJECTIVE:     PHYSICAL EXAM:  Ht 5' 9" (1.753 m)   Wt 77 kg (169 lb 12.1 oz)   LMP  (LMP Unknown)   BMI 25.07 kg/m²   General: Pleasant, cooperative, NAD.  HEENT: NCAT, sclera nonicteric.  Lungs: Respirations are equal and unlabored.   Abdomen: Soft and non-tender.  CV: 2+ bilateral upper and lower extremity pulses.  Neuro: Sensation intact to light touch.  Skin: Intact throughout LE with no rashes, erythema, or lesions.  Extremities: No LE edema, NVI lower extremities. normal gait.    right Knee Exam:  Knee Range of Motion: 0-130   Effusion: none  Condition of skin: intact  Location of tenderness: Medial joint line and Lateral joint line   Strength: 5/5 quadriceps strength, 5/5 gastroc-soleus strength, 5/5 hamstring strength, and 5/5 tibialis anterior strength  Stability: stable to testing  Knee Alignment: normal  [place]rett: negative    left Knee Exam:  Knee Range of Motion: 0-130   Effusion: none  Condition of skin: intact  Location of tenderness: Medial joint line and Lateral joint line   Strength: 5/5 quadriceps strength, 5/5 gastroc-soleus strength, 5/5 hamstring strength, and 5/5 tibialis anterior strength  Knee alignment: normal  Stability: stable to testing  E[place]rett: negative    RADIOGRAPHS:  X-rays of the bilateral knee taken today personally reviewed. Imaging reveals no acute fractures or dislocations. Satisfactory preservation of joint spacing bilaterally.      ASSESSMENT:       ICD-10-CM ICD-9-CM   1. Pain in both knees, unspecified chronicity  M25.561 719.46    M25.562        PLAN:     We discussed with the patient " at length all the different treatment options available including anti-inflammatories, acetaminophen, rest, ice, knee strengthening exercise, occasional cortisone injections for temporary relief, Viscosupplimentation injections, arthroscopic debridement, osteotomy, and finally knee arthroplasty.     Assessment & Plan    - Stay active and continue desired activities within reason.  - Meloxicam  - Avoid for 1 week, then take for 2 weeks after the 1-week break, then as needed.  - Referred to PT for knee strengthening and manual therapy.  - Follow up if knee pain worsens for potential MRI or steroid injection consideration.          This note was generated with the assistance of ambient listening technology. Verbal consent was obtained by the patient and accompanying visitor(s) for the recording of patient appointment to facilitate this note. I attest to having reviewed and edited the generated note for accuracy, though some syntax or spelling errors may persist. Please contact the author of this note for any clarification.         Simone Ibarra PA-C         [1]   Current Outpatient Medications:     benzonatate (TESSALON) 100 MG capsule, Take 200 mg by mouth., Disp: , Rfl:     buPROPion (WELLBUTRIN XL) 150 MG TB24 tablet, Take 300 mg by mouth once daily., Disp: , Rfl:     buPROPion (WELLBUTRIN XL) 300 MG 24 hr tablet, Take 300 mg by mouth every morning., Disp: , Rfl:     doxycycline (VIBRAMYCIN) 100 MG Cap, Take 1 capsule (100 mg total) by mouth every 12 (twelve) hours., Disp: 14 capsule, Rfl: 0    fluticasone propionate (FLONASE) 50 mcg/actuation nasal spray, 1 spray (50 mcg total) by Each Nostril route once daily., Disp: 16 mL, Rfl: 0    lamoTRIgine (LAMICTAL) 200 MG tablet, Take 200 mg by mouth once daily., Disp: , Rfl:     levonorgestrel (MIRENA IU), by Intrauterine route., Disp: , Rfl:     ondansetron (ZOFRAN-ODT) 4 MG TbDL, Take 2 tablets (8 mg total) by mouth every 8 (eight) hours as needed (nausea,  vomiting)., Disp: 12 tablet, Rfl: 0    traZODone (DESYREL) 100 MG tablet, Take 100 mg by mouth every evening., Disp: , Rfl:     meloxicam (MOBIC) 7.5 MG tablet, Take 1 tablet (7.5 mg total) by mouth once daily., Disp: 30 tablet, Rfl: 1

## 2025-05-12 ENCOUNTER — OFFICE VISIT (OUTPATIENT)
Facility: CLINIC | Age: 40
End: 2025-05-12
Payer: OTHER GOVERNMENT

## 2025-05-12 VITALS — SYSTOLIC BLOOD PRESSURE: 118 MMHG | HEART RATE: 73 BPM | DIASTOLIC BLOOD PRESSURE: 74 MMHG

## 2025-05-12 DIAGNOSIS — R53.83 FATIGUE DUE TO SLEEP PATTERN DISTURBANCE: ICD-10-CM

## 2025-05-12 DIAGNOSIS — R41.89 COGNITIVE CHANGES: ICD-10-CM

## 2025-05-12 DIAGNOSIS — G47.9 FATIGUE DUE TO SLEEP PATTERN DISTURBANCE: ICD-10-CM

## 2025-05-12 DIAGNOSIS — F34.89 OTHER SPECIFIED PERSISTENT MOOD DISORDERS: ICD-10-CM

## 2025-05-12 DIAGNOSIS — S13.4XXA WHIPLASH INJURIES, INITIAL ENCOUNTER: Primary | ICD-10-CM

## 2025-05-12 PROCEDURE — 99204 OFFICE O/P NEW MOD 45 MIN: CPT | Mod: S$PBB,,, | Performed by: STUDENT IN AN ORGANIZED HEALTH CARE EDUCATION/TRAINING PROGRAM

## 2025-05-12 PROCEDURE — 99213 OFFICE O/P EST LOW 20 MIN: CPT | Mod: PBBFAC | Performed by: STUDENT IN AN ORGANIZED HEALTH CARE EDUCATION/TRAINING PROGRAM

## 2025-05-12 PROCEDURE — 99999 PR PBB SHADOW E&M-EST. PATIENT-LVL III: CPT | Mod: PBBFAC,,, | Performed by: STUDENT IN AN ORGANIZED HEALTH CARE EDUCATION/TRAINING PROGRAM

## 2025-05-12 NOTE — PATIENT INSTRUCTIONS
Follow return to play guidelines: Light aerobic exercise with walking or stationary bike at slow to medium pace and no resistance training. If no symptoms at 24 hours, may do endurance exercise drills (running and skating drills) for your sports that do not involve contact (do not head soccer ball, hit other players, etc). If no symptoms at 24 hours, may do coordination drills for your sports (passing, throwing, shooting, etc) while wearing protective equipment but no contact drills and may start progressive resistance training (lifting weights, swimming, etc). If no symptoms for 24 hours, may do full contact practice. If no symptoms at 24 hours, may resume full game participation.  If symptoms return during or after following return to play guidelines, decrease activity to the previous step of the return to play guidelines and ice back of neck for 20 minutes. Once symptoms resolve, increase activities more slowly. If symptoms continue to return with increased activity or become persistent, call the clinic at [phone] or contact me thru [facility].  Ensure adequate hydration so that urine is clear in color throughout the entire day including after athletic participation.  F/u with ophthalmology for routine eye exam.
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
## 2025-06-27 ENCOUNTER — TELEPHONE (OUTPATIENT)
Dept: OBSTETRICS AND GYNECOLOGY | Facility: CLINIC | Age: 40
End: 2025-06-27
Payer: OTHER GOVERNMENT

## 2025-06-27 DIAGNOSIS — Z30.433 ENCOUNTER FOR REPLACEMENT OF INTRAUTERINE CONTRACEPTIVE DEVICE: Primary | ICD-10-CM

## 2025-07-11 ENCOUNTER — HOSPITAL ENCOUNTER (OUTPATIENT)
Dept: RADIOLOGY | Facility: HOSPITAL | Age: 40
Discharge: HOME | End: 2025-07-11
Attending: STUDENT IN AN ORGANIZED HEALTH CARE EDUCATION/TRAINING PROGRAM
Payer: OTHER GOVERNMENT

## 2025-07-11 DIAGNOSIS — Z12.31 VISIT FOR SCREENING MAMMOGRAM: ICD-10-CM

## 2025-07-11 PROCEDURE — 77063 BREAST TOMOSYNTHESIS BI: CPT | Mod: 26,,, | Performed by: RADIOLOGY

## 2025-07-11 PROCEDURE — 77063 BREAST TOMOSYNTHESIS BI: CPT | Mod: TC

## 2025-07-11 PROCEDURE — 77067 SCR MAMMO BI INCL CAD: CPT | Mod: 26,,, | Performed by: RADIOLOGY

## 2025-07-23 ENCOUNTER — PATIENT MESSAGE (OUTPATIENT)
Dept: OBSTETRICS AND GYNECOLOGY | Facility: CLINIC | Age: 40
End: 2025-07-23
Payer: OTHER GOVERNMENT

## 2025-08-13 ENCOUNTER — PATIENT MESSAGE (OUTPATIENT)
Dept: INTERNAL MEDICINE | Facility: CLINIC | Age: 40
End: 2025-08-13
Payer: OTHER GOVERNMENT

## 2025-08-13 DIAGNOSIS — S76.302A LEFT HAMSTRING INJURY, INITIAL ENCOUNTER: Primary | ICD-10-CM

## 2025-08-26 ENCOUNTER — HOSPITAL ENCOUNTER (OUTPATIENT)
Dept: RADIOLOGY | Facility: HOSPITAL | Age: 40
Discharge: HOME | End: 2025-08-26
Payer: OTHER GOVERNMENT

## 2025-08-26 ENCOUNTER — OFFICE VISIT (OUTPATIENT)
Dept: ORTHOPEDICS | Facility: CLINIC | Age: 40
End: 2025-08-26
Payer: OTHER GOVERNMENT

## 2025-08-26 VITALS — HEIGHT: 69 IN | WEIGHT: 164 LBS | BODY MASS INDEX: 24.29 KG/M2

## 2025-08-26 DIAGNOSIS — M25.552 HIP PAIN, LEFT: ICD-10-CM

## 2025-08-26 DIAGNOSIS — S76.312A HAMSTRING STRAIN, LEFT, INITIAL ENCOUNTER: Primary | ICD-10-CM

## 2025-08-26 PROCEDURE — 73502 X-RAY EXAM HIP UNI 2-3 VIEWS: CPT | Mod: TC,LT

## 2025-08-26 PROCEDURE — 99213 OFFICE O/P EST LOW 20 MIN: CPT | Mod: S$PBB,,,

## 2025-08-26 PROCEDURE — 99213 OFFICE O/P EST LOW 20 MIN: CPT | Mod: PBBFAC,25

## 2025-08-26 PROCEDURE — 99999 PR PBB SHADOW E&M-EST. PATIENT-LVL III: CPT | Mod: PBBFAC,,,

## 2025-08-26 PROCEDURE — 73502 X-RAY EXAM HIP UNI 2-3 VIEWS: CPT | Mod: 26,LT,, | Performed by: RADIOLOGY
